# Patient Record
Sex: FEMALE | Race: WHITE
[De-identification: names, ages, dates, MRNs, and addresses within clinical notes are randomized per-mention and may not be internally consistent; named-entity substitution may affect disease eponyms.]

---

## 2019-01-28 ENCOUNTER — HOSPITAL ENCOUNTER (OUTPATIENT)
Dept: HOSPITAL 8 - OUT | Age: 59
Discharge: HOME | End: 2019-01-28
Attending: SPECIALIST
Payer: MEDICARE

## 2019-01-28 VITALS — WEIGHT: 152.12 LBS | BODY MASS INDEX: 26.95 KG/M2 | HEIGHT: 63 IN

## 2019-01-28 VITALS — SYSTOLIC BLOOD PRESSURE: 136 MMHG | DIASTOLIC BLOOD PRESSURE: 74 MMHG

## 2019-01-28 DIAGNOSIS — K21.9: ICD-10-CM

## 2019-01-28 DIAGNOSIS — Z88.0: ICD-10-CM

## 2019-01-28 DIAGNOSIS — N83.311: ICD-10-CM

## 2019-01-28 DIAGNOSIS — N83.312: ICD-10-CM

## 2019-01-28 DIAGNOSIS — Z88.1: ICD-10-CM

## 2019-01-28 DIAGNOSIS — Z88.8: ICD-10-CM

## 2019-01-28 DIAGNOSIS — N81.4: Primary | ICD-10-CM

## 2019-01-28 DIAGNOSIS — N39.46: ICD-10-CM

## 2019-01-28 DIAGNOSIS — N81.89: ICD-10-CM

## 2019-01-28 PROCEDURE — 57265 CMBN AP COLPRHY W/NTRCL RPR: CPT

## 2019-01-28 PROCEDURE — 57288 REPAIR BLADDER DEFECT: CPT

## 2019-01-28 PROCEDURE — 57282 COLPOPEXY EXTRAPERITONEAL: CPT

## 2019-01-28 PROCEDURE — 93005 ELECTROCARDIOGRAM TRACING: CPT

## 2019-01-28 PROCEDURE — C1771 REP DEV, URINARY, W/SLING: HCPCS

## 2019-01-28 PROCEDURE — 58552 LAPARO-VAG HYST INCL T/O: CPT

## 2019-01-28 PROCEDURE — 88307 TISSUE EXAM BY PATHOLOGIST: CPT

## 2019-01-28 RX ADMIN — FENTANYL CITRATE PRN MCG: 50 INJECTION INTRAMUSCULAR; INTRAVENOUS at 09:40

## 2019-01-28 RX ADMIN — MEPERIDINE HYDROCHLORIDE PRN MG: 25 INJECTION, SOLUTION INTRAMUSCULAR; INTRAVENOUS; SUBCUTANEOUS at 10:00

## 2019-01-28 RX ADMIN — MEPERIDINE HYDROCHLORIDE PRN MG: 25 INJECTION, SOLUTION INTRAMUSCULAR; INTRAVENOUS; SUBCUTANEOUS at 10:30

## 2019-01-28 RX ADMIN — FENTANYL CITRATE PRN MCG: 50 INJECTION INTRAMUSCULAR; INTRAVENOUS at 09:30

## 2019-01-28 RX ADMIN — FENTANYL CITRATE PRN MCG: 50 INJECTION INTRAMUSCULAR; INTRAVENOUS at 09:50

## 2019-02-10 ENCOUNTER — HOSPITAL ENCOUNTER (EMERGENCY)
Dept: HOSPITAL 8 - ED | Age: 59
Discharge: HOME | End: 2019-02-10
Payer: MEDICARE

## 2019-02-10 VITALS — BODY MASS INDEX: 27.34 KG/M2 | HEIGHT: 63 IN | WEIGHT: 154.32 LBS

## 2019-02-10 VITALS — SYSTOLIC BLOOD PRESSURE: 114 MMHG | DIASTOLIC BLOOD PRESSURE: 58 MMHG

## 2019-02-10 DIAGNOSIS — Z87.891: ICD-10-CM

## 2019-02-10 DIAGNOSIS — N30.00: Primary | ICD-10-CM

## 2019-02-10 DIAGNOSIS — I10: ICD-10-CM

## 2019-02-10 LAB
ALBUMIN SERPL-MCNC: 3.1 G/DL (ref 3.4–5)
ALP SERPL-CCNC: 73 U/L (ref 45–117)
ALT SERPL-CCNC: 13 U/L (ref 12–78)
ANION GAP SERPL CALC-SCNC: 4 MMOL/L (ref 5–15)
BASOPHILS # BLD AUTO: 0.03 X10^3/UL (ref 0–0.1)
BASOPHILS NFR BLD AUTO: 1 % (ref 0–1)
BILIRUB SERPL-MCNC: 0.2 MG/DL (ref 0.2–1)
CALCIUM SERPL-MCNC: 8.7 MG/DL (ref 8.5–10.1)
CHLORIDE SERPL-SCNC: 111 MMOL/L (ref 98–107)
CREAT SERPL-MCNC: 0.89 MG/DL (ref 0.55–1.02)
CULTURE INDICATED?: YES
EOSINOPHIL # BLD AUTO: 0.15 X10^3/UL (ref 0–0.4)
EOSINOPHIL NFR BLD AUTO: 3 % (ref 1–7)
ERYTHROCYTE [DISTWIDTH] IN BLOOD BY AUTOMATED COUNT: 14.1 % (ref 9.6–15.2)
LYMPHOCYTES # BLD AUTO: 2.08 X10^3/UL (ref 1–3.4)
LYMPHOCYTES NFR BLD AUTO: 36 % (ref 22–44)
MCH RBC QN AUTO: 30.3 PG (ref 27–34.8)
MCHC RBC AUTO-ENTMCNC: 33.6 G/DL (ref 32.4–35.8)
MCV RBC AUTO: 90.4 FL (ref 80–100)
MD: NO
MICROSCOPIC: (no result)
MONOCYTES # BLD AUTO: 0.48 X10^3/UL (ref 0.2–0.8)
MONOCYTES NFR BLD AUTO: 8 % (ref 2–9)
NEUTROPHILS # BLD AUTO: 3.09 X10^3/UL (ref 1.8–6.8)
NEUTROPHILS NFR BLD AUTO: 53 % (ref 42–75)
PLATELET # BLD AUTO: 191 X10^3/UL (ref 130–400)
PMV BLD AUTO: 8.6 FL (ref 7.4–10.4)
PROT SERPL-MCNC: 6.3 G/DL (ref 6.4–8.2)
RBC # BLD AUTO: 4.2 X10^6/UL (ref 3.82–5.3)

## 2019-02-10 PROCEDURE — 83690 ASSAY OF LIPASE: CPT

## 2019-02-10 PROCEDURE — 81001 URINALYSIS AUTO W/SCOPE: CPT

## 2019-02-10 PROCEDURE — 87086 URINE CULTURE/COLONY COUNT: CPT

## 2019-02-10 PROCEDURE — 36415 COLL VENOUS BLD VENIPUNCTURE: CPT

## 2019-02-10 PROCEDURE — 96365 THER/PROPH/DIAG IV INF INIT: CPT

## 2019-02-10 PROCEDURE — 80053 COMPREHEN METABOLIC PANEL: CPT

## 2019-02-10 PROCEDURE — 74177 CT ABD & PELVIS W/CONTRAST: CPT

## 2019-02-10 PROCEDURE — 99284 EMERGENCY DEPT VISIT MOD MDM: CPT

## 2019-02-10 PROCEDURE — 85025 COMPLETE CBC W/AUTO DIFF WBC: CPT

## 2019-02-10 NOTE — NUR
LUNCH RN: ORDERS RECEIVED FROM MD. LABS DRAWN. PT UP TO RESTROOM TO COLLECTED 
URINE SAMPLE FOR TESTING. WILL CONTINUE TO MONITOR.

## 2019-02-10 NOTE — NUR
PT RESTING IN ROOM WITH FAMILY AT BEDSIDE. VSS. NO NEEDS AT THIS TIME. WAITING 
FOR CT AT THIS TIME.

## 2019-02-10 NOTE — NUR
PT TO ED FOR INCERASING LOWER ABD PAIN FOLLOWING TOTAL HYSTERECTOMY ON 1/28. PT 
STATES PAIN MEDS MAKE HER ITCHY, SO SHE STOPPED TAKING THEM. CONNECTED TO ALL 
MONITORS. VSS. CALL LIGHT WITHIN REACH. NO OTHER NEEDS AT THIS TIME. AWAITING 
MD ASSESSMENT AT THIS TIME.